# Patient Record
Sex: MALE | Race: WHITE | NOT HISPANIC OR LATINO | Employment: FULL TIME | URBAN - METROPOLITAN AREA
[De-identification: names, ages, dates, MRNs, and addresses within clinical notes are randomized per-mention and may not be internally consistent; named-entity substitution may affect disease eponyms.]

---

## 2017-03-24 ENCOUNTER — HOSPITAL ENCOUNTER (EMERGENCY)
Facility: MEDICAL CENTER | Age: 32
End: 2017-03-24
Attending: EMERGENCY MEDICINE
Payer: MEDICAID

## 2017-03-24 ENCOUNTER — APPOINTMENT (OUTPATIENT)
Dept: RADIOLOGY | Facility: MEDICAL CENTER | Age: 32
End: 2017-03-24
Attending: EMERGENCY MEDICINE
Payer: MEDICAID

## 2017-03-24 VITALS
RESPIRATION RATE: 18 BRPM | SYSTOLIC BLOOD PRESSURE: 130 MMHG | HEART RATE: 76 BPM | TEMPERATURE: 97 F | DIASTOLIC BLOOD PRESSURE: 78 MMHG | BODY MASS INDEX: 19.6 KG/M2 | WEIGHT: 140 LBS | HEIGHT: 71 IN | OXYGEN SATURATION: 99 %

## 2017-03-24 DIAGNOSIS — S20.212A CHEST WALL CONTUSION, LEFT, INITIAL ENCOUNTER: ICD-10-CM

## 2017-03-24 DIAGNOSIS — F43.22 ADJUSTMENT DISORDER WITH ANXIOUS MOOD: ICD-10-CM

## 2017-03-24 LAB
ABO GROUP BLD: NORMAL
ALBUMIN SERPL BCP-MCNC: 4.3 G/DL (ref 3.2–4.9)
ALBUMIN/GLOB SERPL: 1.3 G/DL
ALP SERPL-CCNC: 91 U/L (ref 30–99)
ALT SERPL-CCNC: 17 U/L (ref 2–50)
ANION GAP SERPL CALC-SCNC: 7 MMOL/L (ref 0–11.9)
APTT PPP: 28.4 SEC (ref 24.7–36)
AST SERPL-CCNC: 22 U/L (ref 12–45)
BASOPHILS # BLD AUTO: 1 % (ref 0–1.8)
BASOPHILS # BLD: 0.09 K/UL (ref 0–0.12)
BILIRUB SERPL-MCNC: 0.5 MG/DL (ref 0.1–1.5)
BLD GP AB SCN SERPL QL: NORMAL
BUN SERPL-MCNC: 12 MG/DL (ref 8–22)
CALCIUM SERPL-MCNC: 9.5 MG/DL (ref 8.5–10.5)
CHLORIDE SERPL-SCNC: 105 MMOL/L (ref 96–112)
CO2 SERPL-SCNC: 27 MMOL/L (ref 20–33)
CREAT SERPL-MCNC: 0.87 MG/DL (ref 0.5–1.4)
EOSINOPHIL # BLD AUTO: 0.65 K/UL (ref 0–0.51)
EOSINOPHIL NFR BLD: 7.2 % (ref 0–6.9)
ERYTHROCYTE [DISTWIDTH] IN BLOOD BY AUTOMATED COUNT: 44.5 FL (ref 35.9–50)
ETHANOL BLD-MCNC: 0 G/DL
GFR SERPL CREATININE-BSD FRML MDRD: >60 ML/MIN/1.73 M 2
GLOBULIN SER CALC-MCNC: 3.2 G/DL (ref 1.9–3.5)
GLUCOSE SERPL-MCNC: 90 MG/DL (ref 65–99)
HCT VFR BLD AUTO: 50.4 % (ref 42–52)
HGB BLD-MCNC: 16.7 G/DL (ref 14–18)
IMM GRANULOCYTES # BLD AUTO: 0.01 K/UL (ref 0–0.11)
IMM GRANULOCYTES NFR BLD AUTO: 0.1 % (ref 0–0.9)
INR PPP: 0.99 (ref 0.87–1.13)
LIPASE SERPL-CCNC: 27 U/L (ref 11–82)
LYMPHOCYTES # BLD AUTO: 1.55 K/UL (ref 1–4.8)
LYMPHOCYTES NFR BLD: 17.2 % (ref 22–41)
MCH RBC QN AUTO: 31.5 PG (ref 27–33)
MCHC RBC AUTO-ENTMCNC: 33.1 G/DL (ref 33.7–35.3)
MCV RBC AUTO: 94.9 FL (ref 81.4–97.8)
MONOCYTES # BLD AUTO: 0.66 K/UL (ref 0–0.85)
MONOCYTES NFR BLD AUTO: 7.3 % (ref 0–13.4)
NEUTROPHILS # BLD AUTO: 6.07 K/UL (ref 1.82–7.42)
NEUTROPHILS NFR BLD: 67.2 % (ref 44–72)
NRBC # BLD AUTO: 0 K/UL
NRBC BLD AUTO-RTO: 0 /100 WBC
PLATELET # BLD AUTO: 247 K/UL (ref 164–446)
PMV BLD AUTO: 9.2 FL (ref 9–12.9)
POTASSIUM SERPL-SCNC: 4.4 MMOL/L (ref 3.6–5.5)
PROT SERPL-MCNC: 7.5 G/DL (ref 6–8.2)
PROTHROMBIN TIME: 13.4 SEC (ref 12–14.6)
RBC # BLD AUTO: 5.31 M/UL (ref 4.7–6.1)
RH BLD: NORMAL
SODIUM SERPL-SCNC: 139 MMOL/L (ref 135–145)
TROPONIN I SERPL-MCNC: <0.01 NG/ML (ref 0–0.04)
TROPONIN I SERPL-MCNC: <0.01 NG/ML (ref 0–0.04)
WBC # BLD AUTO: 9 K/UL (ref 4.8–10.8)

## 2017-03-24 PROCEDURE — 96375 TX/PRO/DX INJ NEW DRUG ADDON: CPT

## 2017-03-24 PROCEDURE — 85730 THROMBOPLASTIN TIME PARTIAL: CPT

## 2017-03-24 PROCEDURE — 93005 ELECTROCARDIOGRAM TRACING: CPT | Performed by: EMERGENCY MEDICINE

## 2017-03-24 PROCEDURE — 85610 PROTHROMBIN TIME: CPT

## 2017-03-24 PROCEDURE — 83690 ASSAY OF LIPASE: CPT

## 2017-03-24 PROCEDURE — 80053 COMPREHEN METABOLIC PANEL: CPT

## 2017-03-24 PROCEDURE — 71260 CT THORAX DX C+: CPT

## 2017-03-24 PROCEDURE — 96376 TX/PRO/DX INJ SAME DRUG ADON: CPT

## 2017-03-24 PROCEDURE — 85025 COMPLETE CBC W/AUTO DIFF WBC: CPT

## 2017-03-24 PROCEDURE — 700117 HCHG RX CONTRAST REV CODE 255: Performed by: EMERGENCY MEDICINE

## 2017-03-24 PROCEDURE — 86900 BLOOD TYPING SEROLOGIC ABO: CPT

## 2017-03-24 PROCEDURE — 86901 BLOOD TYPING SEROLOGIC RH(D): CPT

## 2017-03-24 PROCEDURE — 700105 HCHG RX REV CODE 258: Performed by: EMERGENCY MEDICINE

## 2017-03-24 PROCEDURE — 84484 ASSAY OF TROPONIN QUANT: CPT

## 2017-03-24 PROCEDURE — 96374 THER/PROPH/DIAG INJ IV PUSH: CPT

## 2017-03-24 PROCEDURE — 80307 DRUG TEST PRSMV CHEM ANLYZR: CPT

## 2017-03-24 PROCEDURE — 86850 RBC ANTIBODY SCREEN: CPT

## 2017-03-24 PROCEDURE — 700111 HCHG RX REV CODE 636 W/ 250 OVERRIDE (IP): Performed by: EMERGENCY MEDICINE

## 2017-03-24 PROCEDURE — 99285 EMERGENCY DEPT VISIT HI MDM: CPT

## 2017-03-24 RX ORDER — ONDANSETRON 2 MG/ML
4 INJECTION INTRAMUSCULAR; INTRAVENOUS ONCE
Status: COMPLETED | OUTPATIENT
Start: 2017-03-24 | End: 2017-03-24

## 2017-03-24 RX ORDER — HYDROCODONE BITARTRATE AND ACETAMINOPHEN 5; 325 MG/1; MG/1
1 TABLET ORAL EVERY 8 HOURS PRN
Qty: 20 TAB | Refills: 0 | Status: SHIPPED | OUTPATIENT
Start: 2017-03-24 | End: 2017-06-19

## 2017-03-24 RX ORDER — NAPROXEN 500 MG/1
500 TABLET ORAL 2 TIMES DAILY WITH MEALS
Qty: 14 TAB | Refills: 0 | Status: SHIPPED | OUTPATIENT
Start: 2017-03-24 | End: 2017-06-19

## 2017-03-24 RX ORDER — SODIUM CHLORIDE 9 MG/ML
INJECTION, SOLUTION INTRAVENOUS CONTINUOUS
Status: DISCONTINUED | OUTPATIENT
Start: 2017-03-24 | End: 2017-03-24 | Stop reason: HOSPADM

## 2017-03-24 RX ORDER — LORAZEPAM 1 MG/1
1 TABLET ORAL EVERY 8 HOURS PRN
Qty: 20 TAB | Refills: 0 | Status: SHIPPED | OUTPATIENT
Start: 2017-03-24 | End: 2017-06-19

## 2017-03-24 RX ADMIN — SODIUM CHLORIDE: 9 INJECTION, SOLUTION INTRAVENOUS at 10:18

## 2017-03-24 RX ADMIN — ONDANSETRON 4 MG: 2 INJECTION, SOLUTION INTRAMUSCULAR; INTRAVENOUS at 10:30

## 2017-03-24 RX ADMIN — HYDROMORPHONE HYDROCHLORIDE 1 MG: 1 INJECTION, SOLUTION INTRAMUSCULAR; INTRAVENOUS; SUBCUTANEOUS at 11:33

## 2017-03-24 RX ADMIN — HYDROMORPHONE HYDROCHLORIDE 1 MG: 1 INJECTION, SOLUTION INTRAMUSCULAR; INTRAVENOUS; SUBCUTANEOUS at 10:30

## 2017-03-24 RX ADMIN — IOHEXOL 100 ML: 350 INJECTION, SOLUTION INTRAVENOUS at 11:06

## 2017-03-24 ASSESSMENT — PAIN SCALES - GENERAL
PAINLEVEL_OUTOF10: 4
PAINLEVEL_OUTOF10: 8

## 2017-03-24 NOTE — DISCHARGE INSTRUCTIONS
Chest Contusion  A contusion is a deep bruise. Bruises happen when an injury causes bleeding under the skin. Signs of bruising include pain, puffiness (swelling), and discolored skin. The bruise may turn blue, purple, or yellow.   HOME CARE  · Put ice on the injured area.  ¨ Put ice in a plastic bag.  ¨ Place a towel between the skin and the bag.  ¨ Leave the ice on for 15-20 minutes at a time, 03-04 times a day for the first 48 hours.  · Only take medicine as told by your doctor.  · Rest.  · Take deep breaths (deep-breathing exercises) as told by your doctor.  · Stop smoking if you smoke.  · Do not lift objects over 5 pounds (2.3 kilograms) for 3 days or longer if told by your doctor.  GET HELP RIGHT AWAY IF:   · You have more bruising or puffiness.  · You have pain that gets worse.  · You have trouble breathing.  · You are dizzy, weak, or pass out (faint).  · You have blood in your pee (urine) or poop (stool).  · You cough up or throw up (vomit) blood.  · Your puffiness or pain is not helped with medicines.  MAKE SURE YOU:   · Understand these instructions.  · Will watch your condition.  · Will get help right away if you are not doing well or get worse.     This information is not intended to replace advice given to you by your health care provider. Make sure you discuss any questions you have with your health care provider.     Document Released: 06/05/2009 Document Revised: 09/11/2013 Document Reviewed: 06/10/2013  [employer] display technologies Interactive Patient Education ©2016 Elsevier Inc.

## 2017-03-24 NOTE — ED NOTES
Patient to rm 12 with c/o LUQ pain, frequent burping for the last several days on and off.  Patient has many concerns and ideas of what is going on from internet.  Has bruising to left ribs, states could have injured on [place] day but doesn't remember.  States has happened before in the last year.

## 2017-03-24 NOTE — ED AVS SNAPSHOT
[employer] Access Code: [email or web address]  Expires: 4/23/2017  1:06 PM    Your email address is not on file at [facility].  Email Addresses are required for you to sign up for [employer], please contact [phone] to verify your personal information and to provide your email address prior to attempting to register for [employer].    Nirmal Mckeon  [address] [place] [address]    [employer]  A secure, online tool to manage your health information     [facility]’s [employer]® is a secure, online tool that connects you to your personalized health information from the privacy of your home -- day or night - making it very easy for you to manage your healthcare. Once the activation process is completed, you can even access your medical information using the [employer] jay, which is available for free in the Apple Jay store or Google Play store.     To learn more about [employer], visit [email or web address]    There are two levels of access available (as shown below):   My Chart Features  [facility] Primary Care Doctor [facility]  Specialists [facility]  Urgent  Care Non-[facility] Primary Care Doctor   Email your healthcare team securely and privately 24/7 X X X    Manage appointments: schedule your next appointment; view details of past/upcoming appointments X      Request prescription refills. X      View recent personal medical records, including lab and immunizations X X X X   View health record, including health history, allergies, medications X X X X   Read reports about your outpatient visits, procedures, consult and ER notes X X X X   See your discharge summary, which is a recap of your hospital and/or ER visit that includes your diagnosis, lab results, and care plan X X  X     How to register for [employer]:  Once your e-mail address has been verified, follow the following steps to sign up for [employer].     1. Go to  [email or web address]  2. Click on the Sign Up Now box, which takes you to the New Member Sign Up page.  You will need to provide the following information:  a. Enter your [employer] Access Code exactly as it appears at the top of this page. (You will not need to use this code after you’ve completed the sign-up process. If you do not sign up before the expiration date, you must request a new code.)   b. Enter your date of birth.   c. Enter your home email address.   d. Click Submit, and follow the next screen’s instructions.  3. Create a dakickt ID. This will be your [employer] login ID and cannot be changed, so think of one that is secure and easy to remember.  4. Create a [employer] password. You can change your password at any time.  5. Enter your Password Reset Question and Answer. This can be used at a later time if you forget your password.   6. Enter your e-mail address. This allows you to receive e-mail notifications when new information is available in [employer].  7. Click Sign Up. You can now view your health information.    For assistance activating your [employer] account, call [phone]

## 2017-03-24 NOTE — ED PROVIDER NOTES
"ED Provider Note  CHIEF COMPLAINT  Left-sided rib pain and left upper quadrant abdominal pain.    HPI  Nirmal Mckeon is a 32 y.o. male who presents with gradually increasing pain to left upper quadrant of his abdomen is left ribs. He said a cough. Thanks he injured himself and injured his left ribs on [employer]. [place] Day. He's been eating well and taking in fluids well. Hasn't had anything beats since last evening. Has persistent increasing pleuritic pain. No injury to his head nor his neck or his back. No headache and no back pain. No stiff neck. No sputum production. No extremity injury. No right-sided abdominal pain. No pelvic pain. No urinary symptoms.    REVIEW OF SYSTEMS  No headache, no neck pain, no back pain, no extremity pain.  ALL OTHER SYSTEMS NEGATIVE    ALLERGIES  Allergies   Allergen Reactions   • Benadryl [Altaryl]        CURRENT MEDICATIONS  Negative    PAST MEDICAL HISTORY  Negative      FAMILY HISTORY  Noncontributory    SOCIAL HISTORY  Cigarette smoker, marijuana user, alcohol user.    PHYSICAL EXAM  GENERAL: Alert young male adult  VITAL SIGNS: /78 mmHg  Pulse 77  Temp(Src) 36.1 °C (97 °F)  Resp 18  Ht 1.803 m (5' 11\")  Wt 63.504 kg (140 lb)  BMI 19.53 kg/m2  SpO2 99%   Constitutional: Alert healthy-appearing adult HENT: Scalp is normal size and nontender. Ears are clear. Nose is clear. Throat is clear with no stridor no drooling no trismus. Teeth are all intact.  Eyes: Pupils equal round and reactive to light, extraocular motor fall. There is no scleral icterus.  Neck: Neck is supple and nontender. There is no meningismus. No adenitis. No thyromegaly.  Lymphatic: No adenopathy.   Cardiovascular: Heart regular rhythm without murmurs or gallops   Thorax & Lungs: Left lateral chest wall tenderness. Lungs are congested patient has good breath sounds bilateral. No rales, some scattered rhonchi, no wheezes.  Abdomen: Abdomen is soft, nontender, not rigid, no guarding, and no " organomegaly. There is no palpable hernia   Skin: Warm, pink, and dry with no erythema and no rash.   Back: Nontender, no midline bony tenderness, no flank tenderness.  Genitlia: Testicles normal size and nontender with no torsion no epididymitis.  Extremities: Full range of motion  No tenderness to palpation and no deformities noted. No calf or thigh swelling. No calf or thigh tenderness. No clinical DVT.  Neurologic: Alert & oriented . Cranial nerves are grossly intact as tested. Patient moves all 4 extremities well. Patient has good strong flexion and extension of the ankle joints knee joints hip joints and elbow joints. Sensation is normal and symmetrical in the upper and lower extremities.   Psychiatric: Patient is alert oriented coherent and rational.     EKG  EKG Interpretation    Interpreted by me    Rhythm: normal sinus   Rate: normal  Axis: normal  Ectopy: none  Conduction: normal  ST Segments: no acute change  T Waves: no acute change  Q Waves: none    Clinical Impression: Normal sinus rhythm, T waves in lead V2 and V3 and V4. Probable J-point elevation in V2 V3 and V4 and V5.    RADIOLOGY/PROCEDURES  CT-CHEST,ABDOMEN,PELVIS WITH   Final Result         1. No acute traumatic change in the chest, abdomen or pelvis.            COURSE & MEDICAL DECISION MAKING  The patient has left lateral rib pain with a small contusion in that area. Also left upper quadrant abdominal pain. Differential diagnosis: Rib injury, chest wall contusion, rib fracture, pneumothorax, pneumonia, intra-abdominal injury, etc.    Plan: #1 IV #2 cardiac monitor, pulse ox monitor, blood pressure monitor. #3. Intravenous Zofran and Dilaudid No. 4. CT of the chest abdomen and pelvis #5. Chest x-ray #6. Intravenous Zofran and Dilaudid No. 7. Observation in the ED.    Laboratory examination: The patient has an EKG which shows normal sinus rhythm he has peak T waves in leads V2, V3, V4, V5. And he has some nonspecific ST changes consistent  probably with J-point elevation. However I will have the cardiologist review the EKG. And a troponin has been ordered. Nonspecific enough to call it a STEMI at this point. Dr. Campos coburn another ER physician is also look at the EKG and agrees that this was likely J-point elevation is not STEMI.    CT of the chest abdomen and pelvis is normal. Mr. panel normal. Lipase 27. Blood alcohol 0. INR 0.99.  CBC is normal.  Results for orders placed or performed during the hospital encounter of 03/24/17   CBC WITH DIFFERENTIAL   Result Value Ref Range    WBC 9.0 4.8 - 10.8 K/uL    RBC 5.31 4.70 - 6.10 M/uL    Hemoglobin 16.7 14.0 - 18.0 g/dL    Hematocrit 50.4 42.0 - 52.0 %    MCV 94.9 81.4 - 97.8 fL    MCH 31.5 27.0 - 33.0 pg    MCHC 33.1 (L) 33.7 - 35.3 g/dL    RDW 44.5 35.9 - 50.0 fL    Platelet Count 247 164 - 446 K/uL    MPV 9.2 9.0 - 12.9 fL    Neutrophils-Polys 67.20 44.00 - 72.00 %    Lymphocytes 17.20 (L) 22.00 - 41.00 %    Monocytes 7.30 0.00 - 13.40 %    Eosinophils 7.20 (H) 0.00 - 6.90 %    Basophils 1.00 0.00 - 1.80 %    Immature Granulocytes 0.10 0.00 - 0.90 %    Nucleated RBC 0.00 /100 WBC    Neutrophils (Absolute) 6.07 1.82 - 7.42 K/uL    Lymphs (Absolute) 1.55 1.00 - 4.80 K/uL    Monos (Absolute) 0.66 0.00 - 0.85 K/uL    Eos (Absolute) 0.65 (H) 0.00 - 0.51 K/uL    Baso (Absolute) 0.09 0.00 - 0.12 K/uL    Immature Granulocytes (abs) 0.01 0.00 - 0.11 K/uL    NRBC (Absolute) 0.00 K/uL   COMP METABOLIC PANEL   Result Value Ref Range    Sodium 139 135 - 145 mmol/L    Potassium 4.4 3.6 - 5.5 mmol/L    Chloride 105 96 - 112 mmol/L    Co2 27 20 - 33 mmol/L    Anion Gap 7.0 0.0 - 11.9    Glucose 90 65 - 99 mg/dL    Bun 12 8 - 22 mg/dL    Creatinine 0.87 0.50 - 1.40 mg/dL    Calcium 9.5 8.5 - 10.5 mg/dL    AST(SGOT) 22 12 - 45 U/L    ALT(SGPT) 17 2 - 50 U/L    Alkaline Phosphatase 91 30 - 99 U/L    Total Bilirubin 0.5 0.1 - 1.5 mg/dL    Albumin 4.3 3.2 - 4.9 g/dL    Total Protein 7.5 6.0 - 8.2 g/dL    Globulin 3.2 1.9 -  3.5 g/dL    A-G Ratio 1.3 g/dL   LIPASE   Result Value Ref Range    Lipase 27 11 - 82 U/L   PROTHROMBIN TIME   Result Value Ref Range    PT 13.4 12.0 - 14.6 sec    INR 0.99 0.87 - 1.13   APTT   Result Value Ref Range    APTT 28.4 24.7 - 36.0 sec   COD (ADULT)   Result Value Ref Range    ABO Grouping Only B     Rh Grouping Only NEG     Antibody Screen-Cod NEG    DIAGNOSTIC ALCOHOL   Result Value Ref Range    Diagnostic Alcohol 0.00 0.00 g/dL   ESTIMATED GFR   Result Value Ref Range    GFR If African American >60 >60 mL/min/1.73 m 2    GFR If Non African American >60 >60 mL/min/1.73 m 2          I discussed the EKG with the cardiologist Dr. Evie Jiang and she agrees that this is early repolarization. The patient is not a STEMI. Lab and CT the abdomen and chest are normal. He has rib contusions from his fall over same [place] stay. He stable for discharge.    Treatment: #1 hydrocodone and Naprosyn No. 2 patient be given a copy of all of his reports #3 year follow-up with his family doctor. Stable on discharge. Return as needed.      FINAL IMPRESSION  1. Left chest wall contusion         Electronically signed by: Gary Gansert, 3/24/2017 1 PM

## 2017-03-24 NOTE — ED AVS SNAPSHOT
3/24/2017          Nirmal Mckeon  [address]  [facility] [address]    Dear Nirmal:    [facility] wants to ensure your discharge home is safe and you or your loved ones have had all your questions answered regarding your care after you leave the hospital.    You may receive a telephone call within two days of your discharge.  This call is to make certain you understand your discharge instructions as well as ensure we provided you with the best care possible during your stay with us.     The call will only last approximately 3-5 minutes and will be done by a nurse.    Once again, we want to ensure your discharge home is safe and that you have a clear understanding of any next steps in your care.  If you have any questions or concerns, please do not hesitate to contact us, we are here for you.  Thank you for choosing [facility] for your healthcare needs.    Sincerely,    Paresh Richmond    [facility]

## 2017-03-24 NOTE — ED AVS SNAPSHOT
Home Care Instructions                                                                                                                Nirmal Mckeon   MRN: [id number]    Department:  [facility], Emergency Dept   Date of Visit:  3/24/2017            [facility], Emergency Dept    [address] [place] [id number]    Phone:  [phone]      You were seen by     Gary Gansert, M.D.      Your Diagnosis Was     Chest wall contusion, left, initial encounter     S20.212A       These are the medications you received during your hospitalization from 03/24/2017 0856 to 03/24/2017 1306     Date/Time Order Dose Route Action    03/24/2017 1018 NS infusion   Intravenous New Bag    03/24/2017 1030 ondansetron (ZOFRAN) syringe/vial injection 4 mg 4 mg Intravenous Given    03/24/2017 1030 HYDROmorphone (DILAUDID) injection 1 mg 1 mg Intravenous Given    03/24/2017 1106 iohexol (OMNIPAQUE) 350 mg/mL 100 mL Intravenous Given    03/24/2017 1133 HYDROmorphone (DILAUDID) injection 1 mg 1 mg Intravenous Given      Follow-up Information     1. Follow up with Pcp Pt States None.    Specialty:  Family Medicine        2. Follow up with [facility], Emergency Dept.    Specialty:  Emergency Medicine    Why:  As needed    Contact information    [address] [place] [id number] [phone]      Medication Information     Review all of your home medications and newly ordered medications with your primary doctor and/or pharmacist as soon as possible. Follow medication instructions as directed by your doctor and/or pharmacist.     Please keep your complete medication list with you and share with your physician. Update the information when medications are discontinued, doses are changed, or new medications (including over-the-counter products) are added; and carry medication information at all times in the event of emergency situations.               Medication List         START taking these medications        Instructions    Morning Afternoon Evening Bedtime    hydrocodone-acetaminophen 5-325 MG Tabs per tablet   Commonly known as:  NORCO        Take 1 Tab by mouth every 8 hours as needed.   Dose:  1 Tab                        lorazepam 1 MG Tabs   Commonly known as:  ATIVAN        Take 1 Tab by mouth every 8 hours as needed for Anxiety (or muscle spasm).   Dose:  1 mg                        naproxen 500 MG Tabs   Commonly known as:  NAPROSYN        Take 1 Tab by mouth 2 times a day, with meals.   Dose:  500 mg                             Where to Get Your Medications      You can get these medications from any pharmacy     Bring a paper prescription for each of these medications    - hydrocodone-acetaminophen 5-325 MG Tabs per tablet  - lorazepam 1 MG Tabs  - naproxen 500 MG Tabs            Procedures and tests performed during your visit     Procedure/Test Number of Times Performed    APTT 1    CBC WITH DIFFERENTIAL 1    COD (ADULT) 1    COMP METABOLIC PANEL 1    CONSENT FOR CONTRAST INJECTION 1    CT-CHEST,ABDOMEN,PELVIS WITH 1    DIAGNOSTIC ALCOHOL 1    EKG (NOW) 1    ESTIMATED GFR 1    IV Saline Lock 1    LIPASE 1    NURSING COMMUNICATION 3    PROTHROMBIN TIME 1    Pulse Ox 1    TROPONIN 2        Discharge Instructions       Chest Contusion  A contusion is a deep bruise. Bruises happen when an injury causes bleeding under the skin. Signs of bruising include pain, puffiness (swelling), and discolored skin. The bruise may turn blue, purple, or yellow.   HOME CARE  · Put ice on the injured area.  ¨ Put ice in a plastic bag.  ¨ Place a towel between the skin and the bag.  ¨ Leave the ice on for 15-20 minutes at a time, 03-04 times a day for the first 48 hours.  · Only take medicine as told by your doctor.  · Rest.  · Take deep breaths (deep-breathing exercises) as told by your doctor.  · Stop smoking if you smoke.  · Do not lift objects over 5 pounds (2.3 kilograms) for 3 days or  longer if told by your doctor.  GET HELP RIGHT AWAY IF:   · You have more bruising or puffiness.  · You have pain that gets worse.  · You have trouble breathing.  · You are dizzy, weak, or pass out (faint).  · You have blood in your pee (urine) or poop (stool).  · You cough up or throw up (vomit) blood.  · Your puffiness or pain is not helped with medicines.  MAKE SURE YOU:   · Understand these instructions.  · Will watch your condition.  · Will get help right away if you are not doing well or get worse.     This information is not intended to replace advice given to you by your health care provider. Make sure you discuss any questions you have with your health care provider.     Document Released: 06/05/2009 Document Revised: 09/11/2013 Document Reviewed: 06/10/2013  [employer] Interactive Patient Education ©2016 [employer].            Patient Information     Patient Information    Following emergency treatment: all patient requiring follow-up care must return either to a private physician or a clinic if your condition worsens before you are able to obtain further medical attention, please return to the emergency room.     Billing Information    At [facility], we work to make the billing process streamlined for our patients.  Our Representatives are here to answer any questions you may have regarding your hospital bill.  If you have insurance coverage and have supplied your insurance information to us, we will submit a claim to your insurer on your behalf.  Should you have any questions regarding your bill, we can be reached online or by phone as follows:  Online: You are able pay your bills online or live chat with our representatives about any billing questions you may have. We are here to help Monday - Friday from 8:00am to 7:30pm and 9:00am - 12:00pm on Saturdays.  Please visit [email or web address]  for more information.   Phone:  [phone] or [phone]    Please note  that your emergency physician, surgeon, pathologist, radiologist, anesthesiologist, and other specialists are not employed by [facility] and will therefore bill separately for their services.  Please contact them directly for any questions concerning their bills at the numbers below:     Emergency Physician Services:  [phone]  [facility]:  [phone]  [facility]:  [phone]  [facility]:  [phone]    1. Your final bill may vary from the amount quoted upon discharge if all procedures are not complete at that time, or if your doctor has additional procedures of which we are not aware. You will receive an additional bill if you return to the Emergency Department at [facility] for suture removal regardless of the facility of which the sutures were placed.     2. Please arrange for settlement of this account at the emergency registration.    3. All self-pay accounts are due in full at the time of treatment.  If you are unable to meet this obligation then payment is expected within 4-5 days.     4. If you have had radiology studies (CT, X-ray, Ultrasound, MRI), you have received a preliminary result during your emergency department visit. Please contact the radiology department [phone] to receive a copy of your final result. Please discuss the Final result with your primary physician or with the follow up physician provided.     Crisis Hotline:  [place] Crisis Hotline:  7-200-PFPNSKW or 1-375.562.6035  [place] Crisis Hotline:    1-353.491.1798 or 310-024-7359         ED Discharge Follow Up Questions    1. In order to provide you with very good care, we would like to follow up with a phone call in the next few days.  May we have your permission to contact you?     YES /  NO    2. What is the best phone number to call you? (       )_____-__________    3. What is the best time to call you?      Morning  /  Afternoon  /  Evening                   Patient  Signature:  ____________________________________________________________    Date:  ____________________________________________________________

## 2017-04-11 LAB — EKG IMPRESSION: NORMAL

## 2017-06-18 ENCOUNTER — HOSPITAL ENCOUNTER (EMERGENCY)
Facility: MEDICAL CENTER | Age: 32
End: 2017-06-19
Attending: EMERGENCY MEDICINE
Payer: MEDICAID

## 2017-06-18 DIAGNOSIS — F30.10 MANIC BEHAVIOR (HCC): ICD-10-CM

## 2017-06-18 DIAGNOSIS — F22 DELUSIONAL DISORDER(297.1): ICD-10-CM

## 2017-06-18 LAB
AMPHET UR QL SCN: NEGATIVE
BARBITURATES UR QL SCN: NEGATIVE
BENZODIAZ UR QL SCN: NEGATIVE
BZE UR QL SCN: NEGATIVE
CANNABINOIDS UR QL SCN: POSITIVE
MDMA UR QL SCN: NEGATIVE
METHADONE UR QL SCN: NEGATIVE
OPIATES UR QL SCN: NEGATIVE
OXYCODONE UR QL SCN: NEGATIVE
PCP UR QL SCN: NEGATIVE
POC BREATHALIZER: 0.15 PERCENT (ref 0–0.01)
PROPOXYPH UR QL SCN: NEGATIVE

## 2017-06-18 PROCEDURE — 99285 EMERGENCY DEPT VISIT HI MDM: CPT

## 2017-06-18 PROCEDURE — 700111 HCHG RX REV CODE 636 W/ 250 OVERRIDE (IP): Performed by: EMERGENCY MEDICINE

## 2017-06-18 PROCEDURE — 302970 POC BREATHALIZER: Performed by: EMERGENCY MEDICINE

## 2017-06-18 PROCEDURE — 96372 THER/PROPH/DIAG INJ SC/IM: CPT

## 2017-06-18 PROCEDURE — 80307 DRUG TEST PRSMV CHEM ANLYZR: CPT

## 2017-06-18 RX ORDER — ZIPRASIDONE MESYLATE 20 MG/ML
20 INJECTION, POWDER, LYOPHILIZED, FOR SOLUTION INTRAMUSCULAR ONCE
Status: COMPLETED | OUTPATIENT
Start: 2017-06-18 | End: 2017-06-18

## 2017-06-18 RX ADMIN — ZIPRASIDONE MESYLATE 20 MG: 20 INJECTION, POWDER, LYOPHILIZED, FOR SOLUTION INTRAMUSCULAR at 20:18

## 2017-06-18 ASSESSMENT — PAIN SCALES - GENERAL: PAINLEVEL_OUTOF10: 0

## 2017-06-18 NOTE — ED AVS SNAPSHOT
6/19/2017    Nirmal Mckeon  [address]  [facility] [address]    Dear Nirmal:    [facility] wants to ensure your discharge home is safe and you or your loved ones have had all of your questions answered regarding your care after you leave the hospital.    Below is a list of resources and contact information should you have any questions regarding your hospital stay, follow-up instructions, or active medical symptoms.    Questions or Concerns Regarding… Contact   Medical Questions Related to Your Discharge  (7 days a week, 8am-5pm) Contact a Nurse Care Coordinator   [phone]   Medical Questions Not Related to Your Discharge  (24 hours a day / 7 days a week)  Contact the Nurse Health Line   [phone]    Medications or Discharge Instructions Refer to your discharge packet   or contact your [facility] Primary Care Provider   [phone]   Follow-up Appointment(s) Schedule your appointment via Trib[employer]   or contact Scheduling [phone]   Billing Review your statement via [employer]  or contact Billing [phone]   Medical Records Review your records via Tribzi   or contact Medical Records [phone]     You may receive a telephone call within two days of discharge. This call is to make certain you understand your discharge instructions and have the opportunity to have any questions answered. You can also easily access your medical information, test results and upcoming appointments via the Tribzi free online health management tool. You can learn more and sign up at [employer]/Tribzi. For assistance setting up your [employer] account, please call [phone].    Once again, we want to ensure your discharge home is safe and that you have a clear understanding of any next steps in your care. If you have any questions or concerns, please do not hesitate to contact us, we are here for you. Thank you for choosing [facility] for your healthcare needs.    Sincerely,    Your [facility] Healthcare Team

## 2017-06-18 NOTE — ED AVS SNAPSHOT
Home Care Instructions                                                                                                                Nirmal Mckeon   MRN: [id number]    Department:  [facility], Emergency Dept   Date of Visit:  6/18/2017            [facility], Emergency Dept    [address] [place] [id number]    Phone:  [phone]      You were seen by     1. Selene Saravia M.D.    2. Hermilo Godoy M.D.      Your Diagnosis Was     Delusional disorder (CMS-[facility])     F22       These are the medications you received during your hospitalization from 06/18/2017 1925 to 06/19/2017 1059     Date/Time Order Dose Route Action    06/18/2017 2018 ziprasidone (GEODON) injection 20 mg 20 mg Intramuscular Given    06/19/2017 0338 ziprasidone (GEODON) capsule 20 mg 20 mg Oral Given      Follow-up Information     1. Follow up with [facility], Emergency Dept.    Specialty:  Emergency Medicine    Why:  If symptoms worsen    Contact information    [address] [place] [id number] [phone]        2. Follow up with [facility].    Contact information    [address] [place] [address] [address]  [facility] [id number] [phone]        Medication Information     Review all of your home medications and newly ordered medications with your primary doctor and/or pharmacist as soon as possible. Follow medication instructions as directed by your doctor and/or pharmacist.     Please keep your complete medication list with you and share with your physician. Update the information when medications are discontinued, doses are changed, or new medications (including over-the-counter products) are added; and carry medication information at all times in the event of emergency situations.               Medication List      ASK your doctor about these medications        Instructions    Morning Afternoon Evening Bedtime    ALEVE 220 MG tablet   Generic drug:  naproxen        Take 220 mg by  mouth as needed. Indications: Mild to Moderate Pain   Dose:  220 mg                        cyclobenzaprine 10 MG Tabs   Commonly known as:  FLEXERIL        Take 10 mg by mouth 3 times a day as needed for Muscle Spasms.   Dose:  10 mg                        ibuprofen 200 MG Tabs   Commonly known as:  MOTRIN        Take 600 mg by mouth every 6 hours as needed for Mild Pain.   Dose:  600 mg                        SEROQUEL 50 MG tablet   Generic drug:  quetiapine        Take 50 mg by mouth every bedtime.   Dose:  50 mg                                Procedures and tests performed during your visit     Procedure/Test Number of Times Performed    POC BREATHALIZER 2    URINE DRUG SCREEN 1            Patient Information     Patient Information    Following emergency treatment: all patient requiring follow-up care must return either to a private physician or a clinic if your condition worsens before you are able to obtain further medical attention, please return to the emergency room.     Billing Information    At [facility], we work to make the billing process streamlined for our patients.  Our Representatives are here to answer any questions you may have regarding your hospital bill.  If you have insurance coverage and have supplied your insurance information to us, we will submit a claim to your insurer on your behalf.  Should you have any questions regarding your bill, we can be reached online or by phone as follows:  Online: You are able pay your bills online or live chat with our representatives about any billing questions you may have. We are here to help Monday - Friday from 8:00am to 7:30pm and 9:00am - 12:00pm on Saturdays.  Please visit [email or web address]  for more information.   Phone:  [phone] or [phone]    Please note that your emergency physician, surgeon, pathologist, radiologist, anesthesiologist, and other specialists are not employed by [facility] and will  therefore bill separately for their services.  Please contact them directly for any questions concerning their bills at the numbers below:     Emergency Physician Services:  [phone]  [facility]:  [phone]  [facility]:  [phone]  [facility]:  [phone]    1. Your final bill may vary from the amount quoted upon discharge if all procedures are not complete at that time, or if your doctor has additional procedures of which we are not aware. You will receive an additional bill if you return to the Emergency Department at [facility] for suture removal regardless of the facility of which the sutures were placed.     2. Please arrange for settlement of this account at the emergency registration.    3. All self-pay accounts are due in full at the time of treatment.  If you are unable to meet this obligation then payment is expected within 4-5 days.     4. If you have had radiology studies (CT, X-ray, Ultrasound, MRI), you have received a preliminary result during your emergency department visit. Please contact the radiology department [phone] to receive a copy of your final result. Please discuss the Final result with your primary physician or with the follow up physician provided.     Crisis Hotline:  [place] Crisis Hotline:  6-769-UAZIVVT or 1-194.159.2694  [place] Crisis Hotline:    1-843.986.4990 or 597-124-9231         ED Discharge Follow Up Questions    1. In order to provide you with very good care, we would like to follow up with a phone call in the next few days.  May we have your permission to contact you?     YES /  NO    2. What is the best phone number to call you? (       )_____-__________    3. What is the best time to call you?      Morning  /  Afternoon  /  Evening                   Patient Signature:  ____________________________________________________________    Date:  ____________________________________________________________

## 2017-06-19 VITALS
SYSTOLIC BLOOD PRESSURE: 146 MMHG | WEIGHT: 140 LBS | DIASTOLIC BLOOD PRESSURE: 91 MMHG | BODY MASS INDEX: 19.6 KG/M2 | RESPIRATION RATE: 14 BRPM | HEART RATE: 90 BPM | HEIGHT: 71 IN

## 2017-06-19 LAB — POC BREATHALIZER: 0.07 PERCENT (ref 0–0.01)

## 2017-06-19 PROCEDURE — A9270 NON-COVERED ITEM OR SERVICE: HCPCS | Performed by: EMERGENCY MEDICINE

## 2017-06-19 PROCEDURE — 90791 PSYCH DIAGNOSTIC EVALUATION: CPT

## 2017-06-19 PROCEDURE — 700102 HCHG RX REV CODE 250 W/ 637 OVERRIDE(OP): Performed by: EMERGENCY MEDICINE

## 2017-06-19 RX ORDER — IBUPROFEN 200 MG
600 TABLET ORAL EVERY 6 HOURS PRN
COMMUNITY
End: 2019-10-29

## 2017-06-19 RX ORDER — CYCLOBENZAPRINE HCL 10 MG
10 TABLET ORAL 3 TIMES DAILY PRN
COMMUNITY

## 2017-06-19 RX ORDER — ZIPRASIDONE HYDROCHLORIDE 20 MG/1
20 CAPSULE ORAL ONCE
Status: COMPLETED | OUTPATIENT
Start: 2017-06-19 | End: 2017-06-19

## 2017-06-19 RX ORDER — QUETIAPINE FUMARATE 50 MG/1
50 TABLET, FILM COATED ORAL
COMMUNITY

## 2017-06-19 RX ORDER — NAPROXEN SODIUM 220 MG
220 TABLET ORAL PRN
COMMUNITY
End: 2019-10-29

## 2017-06-19 RX ADMIN — ZIPRASIDONE HCL 20 MG: 20 CAPSULE ORAL at 03:38
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
## 2017-06-19 NOTE — ED PROVIDER NOTES
ED Provider Note    Patient was seen by psychiatry. Dr. Beard has removed the legal hold and provided medications. Please see the consultation note. At this point the patient is medically stable and discharged per psychiatry recommendations.

## 2017-06-19 NOTE — CONSULTS
[facility]   TRIAGE ASSESSMENT    Name: Nirmal Mckeon  MRN: [id number]  : 1985  Age: 32 y.o.  Date of assessment: 2017  PCP: Pcp Pt States None  Persons in attendance: Patient    CHIEF COMPLAINT/PRESENTING ISSUE as stated by JUDSON Rollins RN, patient was BIB [employer] on legal hold for psychosis.  Being unable to care for himself.  Threatening, hostile and paranoid with his neighbors.    Information collected:  Patient's father killed himself at age 33 when the patient was 6 yo.  This is the second time in 2 1/2 years this patient has been in this ER for the same type of behavior.  First episode was in  in which he was hospitalized in [place].  Appears to have Bipolar disorder with manic episodes.  He has only taken medication for one week.  He minimizes his part any situation and blames others for what is happening.    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  Patient says his name is Qasim.  Phone number in the chart for Qasim.  JUDSON Rollins RN, tried calling no answer.  See her note.    BEHAVIORAL HEALTH TREATMENT HISTORY  Does patient/parent report a history of prior behavioral health treatment for patient?   Yes:    Dates Level of Care Facilty/Provider Diagnosis/Problem Medications   [place] [facility]      2008 inpt [place] x1 for 2 weeks Bipolar disorder Tried on Lithium and Zyprexa, he took it for 1 week. Off meds now    outpt In [place]                                                            SAFETY ASSESSMENT - SELF  Does patient acknowledge current or past symptoms of dangerousness to self? no  Does parent/significant other report patient has current or past symptoms of dangerousness to self? N\A  Does presenting problem suggest symptoms of dangerousness to self? No    SAFETY ASSESSMENT - OTHERS  Does patient acknowledge  "current or past symptoms of aggressive behavior or risk to others? Not today but made threats in 2014.  Threatening, hostile and paranoid today.  Does parent/significant other report patient has current or past symptoms of aggressive behavior or risk to others?  N\A  Does presenting problem suggest symptoms of dangerousness to others? No    Crisis Safety Plan completed and copy given to patient? N\A    ABUSE/NEGLECT SCREENING  Does patient report feeling “unsafe” in his/her home, or afraid of anyone?  no  Does patient report any history of physical, sexual, or emotional abuse?  no  Does parent or significant other report any of the above? N\A  Is there evidence of neglect by self?  no  Is there evidence of neglect by a caregiver? no  Does the patient/parent report any history of CPS/APS/police involvement related to suspected abuse/neglect or domestic violence? no  Based on the information provided during the current assessment, is a mandated report of suspected abuse/neglect being made?  No    SUBSTANCE USE SCREENING  Yes:  Robert all substances used in the past 30 days:      Last Use Amount   [x]   Alcohol today BA initially 0.153, now sober   [x]   Marijuana today    []   Heroin     []   Prescription Opioids  (used without prescription, for    recreation, or in excess of prescribed amount)     []   Other Prescription  (used without prescription, for    recreation, or in excess of prescribed amount)     []   Cocaine      []   Methamphetamine     []   \"\" drugs (ectasy, MDMA)     []   Other substances        UDS results: positive for THC  Breathalyzer results: 0.068    What consequences does the patient associate with any of the above substance use and or addictive behaviors? None    Risk factors for detox (check all that apply): Denies   []  Seizures   []  Diaphoretic (sweating)   []  Tremors   []  Hallucinations   []  Increased blood pressure   []  Decreased blood pressure   []  Other   []  None      [] " Patient education on risk factors for detoxification and instructed to return to ER as needed.      MENTAL STATUS   Participation: Verbally monopolizing, Guarded, Defensive and Resistant  Grooming: Disheveled  Orientation: Evidence of delusions present  Behavior: Agitated  Eye contact: Intense  Mood: Manic and Irritable  Affect: Incongruent with content and Angry  Thought process: manic  Thought content: Evidence of delusion  Speech: Loud, Pressured, Rapid and Hypertalkative  Perception: manic and delusional  Memory:  No gross evidence of memory deficits  Insight: Poor  Judgment:  Poor  Other:    Collateral information:   Source:  [] Significant other present in person:   [] Significant other by telephone  [] [facility]   [] [facility] Nursing Staff  [x] [facility] Medical [facility]  Alert team assessment from December 2014  [] Other:     [] Unable to complete full assessment due to:  [] Acute intoxication  [] Patient declined to participate/engage  [] Patient verbally unresponsive  [] Significant cognitive deficits  [] Significant perceptual distortions or behavioral disorganization  [] Other:      CLINICAL IMPRESSIONS:  Primary:  Bipolar disorder, manic episode, first episode 9 years ago  Secondary:  Off medications       IDENTIFIED NEEDS/PLAN:  [Trigger DISPOSITION list for any items marked]    []  Imminent safety risk - self [] Imminent safety risk - others   []  Acute substance withdrawl [x]  Psychosis/Impaired reality testing   [x]  Mood/anxiety [x]  Substance use/Addictive behavior   []  Maladaptive behaviro []  Parent/child conflict   []  Family/Couples conflict []  Biomedical   []  Housing []  Financial   []   Legal  Occupational/Educational   []  Domestic violence []  Other:     Disposition: Refer to [facility]    Does patient express agreement with the above plan? No, believes he does not need to be here.    Referral appointment(s) scheduled? N\A    Alert team only:   I have discussed  findings and recommendations with Dr. Saravia who is in agreement with these recommendations.   Manic, unable to care for himself.  Legal hold completed.    Referral documentation sent to the following facilities:  Copy of legal hold given to MARTA Silva, ER, who will coordinate transfer to inpatient psychiatric hospital.    Jyotsna Ochoa R.N.  6/19/2017

## 2017-06-19 NOTE — ED NOTES
Pt BIB [employer] in 4 point restraints.  Pt cursing and angry and placed in 4 point violent restraints as he is a danger to himself and others.  ETOH and possible recreational drugs.

## 2017-06-19 NOTE — ED NOTES
RN was able to get a hold of Qasim the pt's roommate. The roommate stated that Nirmal tends to look his temper and become aggressive. He asked that he be given a phone call with an update when an estimated length of hospitalization is determined.  Qasim [phone]

## 2017-06-19 NOTE — ED NOTES
Pt requested something to help him sleep, he stated that he usually takes Seroquel and that works well for him. Since he was given Geodon earlier he was given 20 mg po Geodon per ERP order. Seroquel will be requested from the MD tomorrow for tomorrow evening. 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 His name is Qasim [phone].  The pt is calm, articulate and appropriate.

## 2017-06-19 NOTE — ED NOTES
Pt given crutches for sprained ankle. Patient verbalize sunedrstanding of discharge instructions. Patient given perscription. Patient to discharge utilizing crutches. NAD or deficitsnoted

## 2017-06-19 NOTE — DISCHARGE PLANNING
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  When asked what his address is by registration and this writer he became more abusive and refused to answer this question or any demographic questions.   is aware of what he stated about caring for someone but he is uncooperative with providing information to have the police do a safety check.

## 2017-06-19 NOTE — DISCHARGE PLANNING
Medical Social Work    Referral: Legal Hold    Intervention: Legal Hold Paperwork given to SW by [employer] RN: Jyotsna    Legal Hold Initiated: Date: 06/18/2017  Time: 1840    Legal Hold faxed: Date: 06/19/2017  Time: 0500    Patient’s Insurance Listed on Face Sheet: [employer]    Referrals sent to: [facility] and [facility]    Plan: Patient will transfer to mental health facility once acceptance is obtained.
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
## 2017-06-19 NOTE — ED NOTES
"Med rec updated and complete  Allergies reviewed  Pt states \"No antibiotics in the last 30 days\".  Pt states \"No vitamins\".    "
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
## 2017-06-19 NOTE — ED NOTES
"Pt requested to have \"elderly roommate Qasim called at [phone] and let him know that I am here in the ER and will be home tomorrow\".  Called number given and received message stating \"this [employer] customer is not available at this time, please try your call later\".  "
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
## 2017-06-20 NOTE — PSYCHIATRY
PSYCHIATRIC CONSULTATION:seen. Full note to follow. Spoke with his roommate as well. Pt willing to take medications and follow up. Legal hold dc'd. Script for abilify 10 mg given.

## 2019-01-04 ENCOUNTER — HOSPITAL ENCOUNTER (EMERGENCY)
Facility: MEDICAL CENTER | Age: 34
End: 2019-01-04
Attending: EMERGENCY MEDICINE
Payer: MEDICAID

## 2019-01-04 ENCOUNTER — APPOINTMENT (OUTPATIENT)
Dept: RADIOLOGY | Facility: MEDICAL CENTER | Age: 34
End: 2019-01-04
Attending: EMERGENCY MEDICINE
Payer: MEDICAID

## 2019-01-04 VITALS
TEMPERATURE: 98 F | SYSTOLIC BLOOD PRESSURE: 140 MMHG | HEIGHT: 70 IN | DIASTOLIC BLOOD PRESSURE: 110 MMHG | WEIGHT: 180.78 LBS | BODY MASS INDEX: 25.88 KG/M2 | RESPIRATION RATE: 20 BRPM | HEART RATE: 104 BPM | OXYGEN SATURATION: 96 %

## 2019-01-04 DIAGNOSIS — S01.01XA LACERATION OF SCALP, INITIAL ENCOUNTER: ICD-10-CM

## 2019-01-04 DIAGNOSIS — S09.90XA CLOSED HEAD INJURY, INITIAL ENCOUNTER: ICD-10-CM

## 2019-01-04 DIAGNOSIS — S20.412A: ICD-10-CM
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
## 2019-01-04 LAB
ABO GROUP BLD: NORMAL
ABO GROUP BLD: NORMAL
ALBUMIN SERPL BCP-MCNC: 4.7 G/DL (ref 3.2–4.9)
ALBUMIN/GLOB SERPL: 1.5 G/DL
ALP SERPL-CCNC: 87 U/L (ref 30–99)
ALT SERPL-CCNC: 23 U/L (ref 2–50)
AMPHET UR QL SCN: NEGATIVE
ANION GAP SERPL CALC-SCNC: 11 MMOL/L (ref 0–11.9)
APTT PPP: 24.4 SEC (ref 24.7–36)
AST SERPL-CCNC: 30 U/L (ref 12–45)
BARBITURATES UR QL SCN: NEGATIVE
BENZODIAZ UR QL SCN: NEGATIVE
BILIRUB SERPL-MCNC: 0.3 MG/DL (ref 0.1–1.5)
BLD GP AB SCN SERPL QL: NORMAL
BUN SERPL-MCNC: 9 MG/DL (ref 8–22)
BZE UR QL SCN: NEGATIVE
CALCIUM SERPL-MCNC: 9.6 MG/DL (ref 8.5–10.5)
CANNABINOIDS UR QL SCN: POSITIVE
CHLORIDE SERPL-SCNC: 106 MMOL/L (ref 96–112)
CO2 SERPL-SCNC: 20 MMOL/L (ref 20–33)
CREAT SERPL-MCNC: 0.96 MG/DL (ref 0.5–1.4)
ERYTHROCYTE [DISTWIDTH] IN BLOOD BY AUTOMATED COUNT: 43.7 FL (ref 35.9–50)
ETHANOL BLD-MCNC: 0.27 G/DL
GLOBULIN SER CALC-MCNC: 3.2 G/DL (ref 1.9–3.5)
GLUCOSE SERPL-MCNC: 99 MG/DL (ref 65–99)
HCT VFR BLD AUTO: 50.2 % (ref 42–52)
HGB BLD-MCNC: 17.2 G/DL (ref 14–18)
INR PPP: 1.06 (ref 0.87–1.13)
MCH RBC QN AUTO: 32 PG (ref 27–33)
MCHC RBC AUTO-ENTMCNC: 34.3 G/DL (ref 33.7–35.3)
MCV RBC AUTO: 93.5 FL (ref 81.4–97.8)
METHADONE UR QL SCN: NEGATIVE
OPIATES UR QL SCN: NEGATIVE
OXYCODONE UR QL SCN: NEGATIVE
PCP UR QL SCN: NEGATIVE
PLATELET # BLD AUTO: 208 K/UL (ref 164–446)
PMV BLD AUTO: 10.2 FL (ref 9–12.9)
POTASSIUM SERPL-SCNC: 3.6 MMOL/L (ref 3.6–5.5)
PROPOXYPH UR QL SCN: NEGATIVE
PROT SERPL-MCNC: 7.9 G/DL (ref 6–8.2)
PROTHROMBIN TIME: 13.9 SEC (ref 12–14.6)
RBC # BLD AUTO: 5.37 M/UL (ref 4.7–6.1)
RH BLD: NORMAL
RH BLD: NORMAL
SODIUM SERPL-SCNC: 137 MMOL/L (ref 135–145)
WBC # BLD AUTO: 10.7 K/UL (ref 4.8–10.8)

## 2019-01-04 PROCEDURE — 85027 COMPLETE CBC AUTOMATED: CPT

## 2019-01-04 PROCEDURE — 85730 THROMBOPLASTIN TIME PARTIAL: CPT

## 2019-01-04 PROCEDURE — 700101 HCHG RX REV CODE 250: Performed by: EMERGENCY MEDICINE

## 2019-01-04 PROCEDURE — 99284 EMERGENCY DEPT VISIT MOD MDM: CPT

## 2019-01-04 PROCEDURE — 304217 HCHG IRRIGATION SYSTEM

## 2019-01-04 PROCEDURE — 86901 BLOOD TYPING SEROLOGIC RH(D): CPT

## 2019-01-04 PROCEDURE — 80307 DRUG TEST PRSMV CHEM ANLYZR: CPT

## 2019-01-04 PROCEDURE — 70450 CT HEAD/BRAIN W/O DYE: CPT

## 2019-01-04 PROCEDURE — 85610 PROTHROMBIN TIME: CPT

## 2019-01-04 PROCEDURE — 307740 HCHG GREEN TRAUMA TEAM SERVICES

## 2019-01-04 PROCEDURE — 90715 TDAP VACCINE 7 YRS/> IM: CPT | Performed by: EMERGENCY MEDICINE

## 2019-01-04 PROCEDURE — 72125 CT NECK SPINE W/O DYE: CPT

## 2019-01-04 PROCEDURE — 90471 IMMUNIZATION ADMIN: CPT

## 2019-01-04 PROCEDURE — 700111 HCHG RX REV CODE 636 W/ 250 OVERRIDE (IP): Performed by: EMERGENCY MEDICINE

## 2019-01-04 PROCEDURE — 80053 COMPREHEN METABOLIC PANEL: CPT

## 2019-01-04 PROCEDURE — 86900 BLOOD TYPING SEROLOGIC ABO: CPT

## 2019-01-04 PROCEDURE — 71045 X-RAY EXAM CHEST 1 VIEW: CPT

## 2019-01-04 PROCEDURE — 86850 RBC ANTIBODY SCREEN: CPT

## 2019-01-04 PROCEDURE — 36415 COLL VENOUS BLD VENIPUNCTURE: CPT

## 2019-01-04 PROCEDURE — 305308 HCHG STAPLER,SKIN,DISP.

## 2019-01-04 PROCEDURE — 304999 HCHG REPAIR-SIMPLE/INTERMED LEVEL 1

## 2019-01-04 RX ADMIN — CLOSTRIDIUM TETANI TOXOID ANTIGEN (FORMALDEHYDE INACTIVATED), CORYNEBACTERIUM DIPHTHERIAE TOXOID ANTIGEN (FORMALDEHYDE INACTIVATED), BORDETELLA PERTUSSIS TOXOID ANTIGEN (GLUTARALDEHYDE INACTIVATED), BORDETELLA PERTUSSIS FILAMENTOUS HEMAGGLUTININ ANTIGEN (FORMALDEHYDE INACTIVATED), BORDETELLA PERTUSSIS PERTACTIN ANTIGEN, AND BORDETELLA PERTUSSIS FIMBRIAE 2/3 ANTIGEN 0.5 ML: 5; 2; 2.5; 5; 3; 5 INJECTION, SUSPENSION INTRAMUSCULAR at 21:07

## 2019-01-04 RX ADMIN — TETRACAINE HCL 3 ML: 10 INJECTION SUBARACHNOID at 22:46

## 2019-01-04 ASSESSMENT — PAIN SCALES - GENERAL: PAINLEVEL_OUTOF10: 6
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
## 2019-01-05 NOTE — ED NOTES
Hourly rounding performed. Assessed patient complaints and Bathroom/comfort needs.  Pt reports feeling unchanged
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
## 2019-10-29 ENCOUNTER — APPOINTMENT (OUTPATIENT)
Dept: RADIOLOGY | Facility: MEDICAL CENTER | Age: 34
End: 2019-10-29
Attending: EMERGENCY MEDICINE
Payer: MEDICAID

## 2019-10-29 ENCOUNTER — HOSPITAL ENCOUNTER (EMERGENCY)
Facility: MEDICAL CENTER | Age: 34
End: 2019-10-29
Attending: EMERGENCY MEDICINE
Payer: MEDICAID

## 2019-10-29 VITALS
WEIGHT: 160.05 LBS | OXYGEN SATURATION: 97 % | HEIGHT: 71 IN | RESPIRATION RATE: 18 BRPM | SYSTOLIC BLOOD PRESSURE: 151 MMHG | HEART RATE: 72 BPM | BODY MASS INDEX: 22.41 KG/M2 | DIASTOLIC BLOOD PRESSURE: 97 MMHG | TEMPERATURE: 98.2 F

## 2019-10-29 DIAGNOSIS — G44.82 COITAL HEADACHE: ICD-10-CM

## 2019-10-29 LAB
BURR CELLS/RBC NFR CSF MANUAL: 0 %
CLARITY CSF: CLEAR
COLOR CSF: COLORLESS
COLOR SPUN CSF: COLORLESS
GLUCOSE CSF-MCNC: 63 MG/DL (ref 40–80)
GRAM STN SPEC: NORMAL
LYMPHOCYTES NFR CSF: 54 %
MONONUC CELLS NFR CSF: 44 %
NEUTROPHILS NFR CSF: 2 %
PROT CSF-MCNC: 42 MG/DL (ref 15–45)
RBC # CSF: 10 CELLS/UL
SIGNIFICANT IND 70042: NORMAL
SITE SITE: NORMAL
SOURCE SOURCE: NORMAL
SPECIMEN VOL CSF: 10 ML
TUBE # CSF: 3
TUBE # CSF: 4
WBC # CSF: 1 CELLS/UL (ref 0–10)

## 2019-10-29 PROCEDURE — A9270 NON-COVERED ITEM OR SERVICE: HCPCS | Performed by: EMERGENCY MEDICINE

## 2019-10-29 PROCEDURE — 96375 TX/PRO/DX INJ NEW DRUG ADDON: CPT

## 2019-10-29 PROCEDURE — 99285 EMERGENCY DEPT VISIT HI MDM: CPT

## 2019-10-29 PROCEDURE — 700111 HCHG RX REV CODE 636 W/ 250 OVERRIDE (IP): Performed by: EMERGENCY MEDICINE

## 2019-10-29 PROCEDURE — 87070 CULTURE OTHR SPECIMN AEROBIC: CPT

## 2019-10-29 PROCEDURE — 70496 CT ANGIOGRAPHY HEAD: CPT

## 2019-10-29 PROCEDURE — 700117 HCHG RX CONTRAST REV CODE 255: Performed by: EMERGENCY MEDICINE

## 2019-10-29 PROCEDURE — 82945 GLUCOSE OTHER FLUID: CPT

## 2019-10-29 PROCEDURE — 84157 ASSAY OF PROTEIN OTHER: CPT

## 2019-10-29 PROCEDURE — 62270 DX LMBR SPI PNXR: CPT

## 2019-10-29 PROCEDURE — 87205 SMEAR GRAM STAIN: CPT

## 2019-10-29 PROCEDURE — 89051 BODY FLUID CELL COUNT: CPT

## 2019-10-29 PROCEDURE — 700105 HCHG RX REV CODE 258: Performed by: EMERGENCY MEDICINE

## 2019-10-29 PROCEDURE — 96374 THER/PROPH/DIAG INJ IV PUSH: CPT

## 2019-10-29 PROCEDURE — 700102 HCHG RX REV CODE 250 W/ 637 OVERRIDE(OP): Performed by: EMERGENCY MEDICINE

## 2019-10-29 RX ORDER — INDOMETHACIN 50 MG/1
50 CAPSULE ORAL
Qty: 30 CAP | Refills: 1 | Status: SHIPPED | OUTPATIENT
Start: 2019-10-29

## 2019-10-29 RX ORDER — SODIUM CHLORIDE 9 MG/ML
1000 INJECTION, SOLUTION INTRAVENOUS ONCE
Status: COMPLETED | OUTPATIENT
Start: 2019-10-29 | End: 2019-10-29

## 2019-10-29 RX ORDER — LIDOCAINE HYDROCHLORIDE 10 MG/ML
INJECTION, SOLUTION INFILTRATION; PERINEURAL
Status: DISCONTINUED
Start: 2019-10-29 | End: 2019-10-29 | Stop reason: HOSPADM

## 2019-10-29 RX ORDER — METOCLOPRAMIDE HYDROCHLORIDE 5 MG/ML
10 INJECTION INTRAMUSCULAR; INTRAVENOUS ONCE
Status: COMPLETED | OUTPATIENT
Start: 2019-10-29 | End: 2019-10-29

## 2019-10-29 RX ORDER — DIPHENHYDRAMINE HYDROCHLORIDE 50 MG/ML
25 INJECTION INTRAMUSCULAR; INTRAVENOUS ONCE
Status: DISCONTINUED | OUTPATIENT
Start: 2019-10-29 | End: 2019-10-29 | Stop reason: HOSPADM

## 2019-10-29 RX ORDER — VALPROATE SODIUM 100 MG/ML
250 INJECTION, SOLUTION INTRAVENOUS ONCE
Status: COMPLETED | OUTPATIENT
Start: 2019-10-29 | End: 2019-10-29

## 2019-10-29 RX ORDER — SUMATRIPTAN 25 MG/1
100 TABLET, FILM COATED ORAL ONCE
Status: COMPLETED | OUTPATIENT
Start: 2019-10-29 | End: 2019-10-29

## 2019-10-29 RX ORDER — PROMETHAZINE HYDROCHLORIDE 25 MG/1
25 TABLET ORAL ONCE
Status: COMPLETED | OUTPATIENT
Start: 2019-10-29 | End: 2019-10-29

## 2019-10-29 RX ORDER — KETOROLAC TROMETHAMINE 30 MG/ML
30 INJECTION, SOLUTION INTRAMUSCULAR; INTRAVENOUS ONCE
Status: COMPLETED | OUTPATIENT
Start: 2019-10-29 | End: 2019-10-29

## 2019-10-29 RX ADMIN — VALPROATE SODIUM 250 MG: 100 INJECTION INTRAVENOUS at 15:47

## 2019-10-29 RX ADMIN — KETOROLAC TROMETHAMINE 30 MG: 30 INJECTION, SOLUTION INTRAMUSCULAR at 15:47

## 2019-10-29 RX ADMIN — PROMETHAZINE HYDROCHLORIDE 25 MG: 25 TABLET ORAL at 14:41

## 2019-10-29 RX ADMIN — SODIUM CHLORIDE 1000 ML: 9 INJECTION, SOLUTION INTRAVENOUS at 15:46

## 2019-10-29 RX ADMIN — SUMATRIPTAN SUCCINATE 100 MG: 25 TABLET ORAL at 14:41

## 2019-10-29 RX ADMIN — METOCLOPRAMIDE 10 MG: 5 INJECTION, SOLUTION INTRAMUSCULAR; INTRAVENOUS at 15:47

## 2019-10-29 RX ADMIN — IOHEXOL 100 ML: 350 INJECTION, SOLUTION INTRAVENOUS at 15:39

## 2019-10-29 NOTE — ED TRIAGE NOTES
"Chief Complaint   Patient presents with   • Headache     throbbing headache started yesterday during sex     Pt reports that this happened 1 week ago but resolved. Pt reports that yesterday during sex he developed a headache that was so bad he was unable to finish. Pt describes the headache as posterior. Pt smells of ETOH.  Pt reports daily ETOH as well as marijuana use. Arrives to triage with fast food.  AAOx4. HTN noted. Pt moaning throughout triage.   Pulse 64   Temp 36.4 °C (97.6 °F) (Temporal)   Resp 18   Ht 1.803 m (5' 11\")   Wt 72.6 kg (160 lb 0.9 oz)   SpO2 100%   Pt informed of wait times. Educated on triage process.  Asked to return to triage RN for any new or worsening of symptoms. Thanked for patience.        "

## 2019-10-29 NOTE — ED PROVIDER NOTES
ED Provider Note    CHIEF COMPLAINT  Chief Complaint   Patient presents with   • Headache     throbbing headache started yesterday during sex       HPI  Nirmal Mckeon is a 34 y.o. male who presents with 3 separate incidents of sudden onset of headache during sexual activity.  He took a week off after the first 2 incidents and was fairly headache free until he had sex again yesterday.  The onset of pain is sudden, camara, and 10 out of 10 as soon as it starts.  He states his pain is a 4 out of 10 now.  It starts by severe throbbing, and then it is a constant ache with occasional throbbing if he is active.  He has occasional temporary waves of nausea, but he has had no vomiting.  He denies any recent fevers, cold symptoms.  He has never had headaches and has no family history of aneurysms or migraines.  Ibuprofen did not seem to help very much when he took it yesterday.  The pain radiates from his occiput down between his shoulders.  Positive photophobia.    REVIEW OF SYSTEMS  See HPI for further details.  No fever, coughing.  All other systems are negative.    PAST MEDICAL HISTORY  No past medical history on file.    FAMILY HISTORY  History reviewed. No pertinent family history.    SOCIAL HISTORY  Social History     Socioeconomic History   • Marital status: Single     Spouse name: Not on file   • Number of children: Not on file   • Years of education: Not on file   • Highest education level: Not on file   Occupational History   • Not on file   Social Needs   • Financial resource strain: Not on file   • Food insecurity:     Worry: Not on file     Inability: Not on file   • Transportation needs:     Medical: Not on file     Non-medical: Not on file   Tobacco Use   • Smoking status: Current Every Day Smoker     Packs/day: 0.20     Types: Cigarettes   • Smokeless tobacco: Never Used   Substance and Sexual Activity   • Alcohol use: Yes     Alcohol/week: 2.4 oz     Types: 4 Glasses of wine per week     Comment: day  "  • Drug use: Yes     Comment: marijuana, daily   • Sexual activity: Not on file   Lifestyle   • Physical activity:     Days per week: Not on file     Minutes per session: Not on file   • Stress: Not on file   Relationships   • Social connections:     Talks on phone: Not on file     Gets together: Not on file     Attends Hinduism service: Not on file     Active member of club or organization: Not on file     Attends meetings of clubs or organizations: Not on file     Relationship status: Not on file   • Intimate partner violence:     Fear of current or ex partner: Not on file     Emotionally abused: Not on file     Physically abused: Not on file     Forced sexual activity: Not on file   Other Topics Concern   • Not on file   Social History Narrative   • Not on file       SURGICAL HISTORY  Past Surgical History:   Procedure Laterality Date   • DENTAL SURGERY      wisdom teeth       CURRENT MEDICATIONS    Current Outpatient Medications:   •  quetiapine (SEROQUEL) 50 MG tablet, Take 50 mg by mouth every bedtime., Disp: , Rfl:   •  cyclobenzaprine (FLEXERIL) 10 MG Tab, Take 10 mg by mouth 3 times a day as needed for Muscle Spasms., Disp: , Rfl:   •  ibuprofen (MOTRIN) 200 MG Tab, Take 600 mg by mouth every 6 hours as needed for Mild Pain., Disp: , Rfl:   •  naproxen (ALEVE) 220 MG tablet, Take 220 mg by mouth as needed. Indications: Mild to Moderate Pain, Disp: , Rfl:       ALLERGIES  Allergies   Allergen Reactions   • Benadryl [Altaryl] Rash     Pt states \"I breakout\".         PHYSICAL EXAM  VITAL SIGNS: /101   Pulse 64   Temp 36.4 °C (97.6 °F) (Temporal)   Resp 18   Ht 1.803 m (5' 11\")   Wt 72.6 kg (160 lb 0.9 oz)   SpO2 100%   BMI 22.32 kg/m²  @AKHIL[[id number]::@   Constitutional: Well developed, Well nourished, No acute respiratory distress, Non-toxic appearance.   HENT: Normocephalic, Atraumatic, Bilateral external ears normal, Oropharynx clear, mucous membranes are moist.  Eyes: PERRLA at 3 mm " bilaterally, EOMI, Conjunctiva normal, No discharge. No icterus.  No nystagmus  Neck: Normal range of motion. Supple, No stridor.  No meningeal signs  Lymphatic: No cervical lymphadenopathy noted.   Cardiovascular: Normal heart rate, Normal rhythm, No murmurs, No rubs, No gallops.   Thorax & Lungs: Clear to auscultation bilaterally, No respiratory distress, No wheezing.  Skin: Warm, Dry, No erythema, No rash.  Mild facial acne  Extremities: Intact distal pulses, No edema, No tenderness.  5 out of 5 strength bilateral upper and lower extremities without drift  Musculoskeletal: Good range of motion in all major joints. No tenderness to palpation or major deformities noted. Normal gait.  Neurologic: Alert & oriented x 3, cranial nerve and cerebellar exams normal.  Sensation intact to the face, hands, feet.  Hand roll backward and forward is symmetric  Psychiatric: Affect normal, Judgment normal, Mood normal.     RADIOLOGY/PROCEDURES  CT-CTA HEAD WITH & W/O-POST PROCESS   Final Result      CT angiogram of the [place] within normal limits.        Lumbar Puncture Procedure Note    Indication: to obtain spinal fluid for diagnostic testing and to rule out subarachnoid hemorrhage    Consent: The patient was counseled regarding the procedure, it's indications, risks, potential complications and alternatives and any questions were answered. Consent was obtained.    Procedure: The patient was placed in the sitting, supported by bed side stand, position and the appropriate landmarks were identified. The area was prepped and draped in the usual sterile fashion. Anesthesia was obtained using 3 cc of 1% Lidocaine without epinephrine. A spinal needle was inserted at the L3- L4 level with the stylet in place until spinal fluid was returned. Opening pressure was not measured. At this point 10.0 cc of clear cerebral spinal fluid was obtained and sent for appropriate testing. The stylet was then replaced and the needle was  withdrawn. A sterile dressing was placed over the site and the patient was placed in the supine position.    The patient tolerated the procedure well.    Complications: None          COURSE & MEDICAL DECISION MAKING  Pertinent Labs & Imaging studies reviewed. (See chart for details)  Differential diagnosis includes coital migraine, aneurysm with/without subarachnoid hemorrhage.  Phenergan and Imitrex given for possible migraine.  Patient had no relief from this, so he was given a liter of fluid with Depacon, Toradol, Reglan.  IV fluids were given for migraine headache.  Upon reevaluation, he states that his headache has gotten better and better with time to the point where he had no headache at the time of discharge.  Because he failed Imitrex therapy, I think it best that he take the anti-inflammatory medication prior to sexual activity to avoid coital migraines.  He should follow-up with his primary care provider, and he states that he has an appointment tomorrow.  He may need to be sent to a neurologist for further evaluation.     The patient will return for new or worsening symptoms and is stable at the time of discharge.    The patient is referred to a primary physician for blood pressure management, diabetic screening, and for all other preventative health concerns.    DISPOSITION:  Patient will be discharged home in stable condition.    FOLLOW UP:  Junior Desouza M.D.  [facility] [facility] [id number] [phone]    Schedule an appointment as soon as possible for a visit         OUTPATIENT MEDICATIONS:  Discharge Medication List as of 10/29/2019  7:04 PM      START taking these medications    Details   indomethacin (INDOCIN) 50 MG Cap Take 1 Cap by mouth Once PRN (30-60 minutes before sexual activity) for up to 1 dose., Disp-30 Cap, R-1, Print Rx Paper              FINAL IMPRESSION  1. Coital headache               Electronically signed by: Zarina Pedersen, 10/29/2019 2:25 PM

## 2019-11-01 LAB
BACTERIA CSF CULT: NORMAL
GRAM STN SPEC: NORMAL
SIGNIFICANT IND 70042: NORMAL
SITE SITE: NORMAL
SOURCE SOURCE: NORMAL

## 2022-07-13 NOTE — ED AVS SNAPSHOT
[employer] Access Code: [email or web address]  Expires: 7/19/2017 10:59 AM    Your email address is not on file at [facility].  Email Addresses are required for you to sign up for [employer], please contact [phone] to verify your personal information and to provide your email address prior to attempting to register for [employer].    Nirmal Mckeon  [address] [place] [address]    [employer]  A secure, online tool to manage your health information     [facility]’s [employer]® is a secure, online tool that connects you to your personalized health information from the privacy of your home -- day or night - making it very easy for you to manage your healthcare. Once the activation process is completed, you can even access your medical information using the [employer] jay, which is available for free in the Apple Jay store or Google Play store.     To learn more about [employer], visit [email or web address]    There are two levels of access available (as shown below):   My Chart Features  [facility] Primary Care Doctor [facility]  Specialists [facility]  Urgent  Care Non-[facility] Primary Care Doctor   Email your healthcare team securely and privately 24/7 X X X    Manage appointments: schedule your next appointment; view details of past/upcoming appointments X      Request prescription refills. X      View recent personal medical records, including lab and immunizations X X X X   View health record, including health history, allergies, medications X X X X   Read reports about your outpatient visits, procedures, consult and ER notes X X X X   See your discharge summary, which is a recap of your hospital and/or ER visit that includes your diagnosis, lab results, and care plan X X  X     How to register for [employer]:  Once your e-mail address has been verified, follow the following steps to sign up for [employer].     1. Go to  [email or web address]  2. Click on the Sign Up Now box, which takes you to the New Member Sign Up page.  Conjuntivae and eyelids appear normal,  Sclerae : White without injection You will need to provide the following information:  a. Enter your [employer] Access Code exactly as it appears at the top of this page. (You will not need to use this code after you’ve completed the sign-up process. If you do not sign up before the expiration date, you must request a new code.)   b. Enter your date of birth.   c. Enter your home email address.   d. Click Submit, and follow the next screen’s instructions.  3. Create a [employer] ID. This will be your [employer] login ID and cannot be changed, so think of one that is secure and easy to remember.  4. Create a [employer] password. You can change your password at any time.  5. Enter your Password Reset Question and Answer. This can be used at a later time if you forget your password.   6. Enter your e-mail address. This allows you to receive e-mail notifications when new information is available in [employer].  7. Click Sign Up. You can now view your health information.    For assistance activating your [employer] account, call [phone]

## 2023-11-25 ENCOUNTER — HOSPITAL ENCOUNTER (OUTPATIENT)
Dept: RADIOLOGY | Facility: MEDICAL CENTER | Age: 38
End: 2023-11-25
Attending: REGISTERED NURSE
Payer: MEDICAID

## 2023-11-25 DIAGNOSIS — M25.561 ANTERIOR KNEE PAIN, RIGHT: ICD-10-CM

## 2023-11-25 PROCEDURE — 73560 X-RAY EXAM OF KNEE 1 OR 2: CPT | Mod: RT

## 2025-03-13 ENCOUNTER — OFFICE VISIT (OUTPATIENT)
Dept: URGENT CARE | Facility: CLINIC | Age: 40
End: 2025-03-13
Payer: MEDICAID

## 2025-03-13 VITALS
TEMPERATURE: 98 F | DIASTOLIC BLOOD PRESSURE: 80 MMHG | RESPIRATION RATE: 16 BRPM | WEIGHT: 153 LBS | HEIGHT: 71 IN | OXYGEN SATURATION: 97 % | BODY MASS INDEX: 21.42 KG/M2 | SYSTOLIC BLOOD PRESSURE: 116 MMHG | HEART RATE: 90 BPM

## 2025-03-13 DIAGNOSIS — L03.90 CELLULITIS, UNSPECIFIED CELLULITIS SITE: ICD-10-CM

## 2025-03-13 PROCEDURE — 99203 OFFICE O/P NEW LOW 30 MIN: CPT | Performed by: PHYSICIAN ASSISTANT

## 2025-03-13 PROCEDURE — 3079F DIAST BP 80-89 MM HG: CPT | Performed by: PHYSICIAN ASSISTANT

## 2025-03-13 PROCEDURE — 3074F SYST BP LT 130 MM HG: CPT | Performed by: PHYSICIAN ASSISTANT

## 2025-03-13 RX ORDER — CEPHALEXIN 500 MG/1
500 CAPSULE ORAL 2 TIMES DAILY
Qty: 14 CAPSULE | Refills: 0 | Status: SHIPPED | OUTPATIENT
Start: 2025-03-13 | End: 2025-03-20

## 2025-03-13 RX ORDER — SULFAMETHOXAZOLE AND TRIMETHOPRIM 800; 160 MG/1; MG/1
1 TABLET ORAL 2 TIMES DAILY
Qty: 14 TABLET | Refills: 0 | Status: SHIPPED | OUTPATIENT
Start: 2025-03-13 | End: 2025-03-20
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
